# Patient Record
Sex: FEMALE | Race: WHITE
[De-identification: names, ages, dates, MRNs, and addresses within clinical notes are randomized per-mention and may not be internally consistent; named-entity substitution may affect disease eponyms.]

---

## 2018-02-20 ENCOUNTER — HOSPITAL ENCOUNTER (EMERGENCY)
Dept: HOSPITAL 62 - ER | Age: 33
Discharge: HOME | End: 2018-02-20
Payer: COMMERCIAL

## 2018-02-20 VITALS — DIASTOLIC BLOOD PRESSURE: 98 MMHG | SYSTOLIC BLOOD PRESSURE: 141 MMHG

## 2018-02-20 DIAGNOSIS — R06.00: ICD-10-CM

## 2018-02-20 DIAGNOSIS — R52: ICD-10-CM

## 2018-02-20 DIAGNOSIS — R50.9: Primary | ICD-10-CM

## 2018-02-20 DIAGNOSIS — R05: ICD-10-CM

## 2018-02-20 PROCEDURE — 99283 EMERGENCY DEPT VISIT LOW MDM: CPT

## 2018-02-20 NOTE — ER DOCUMENT REPORT
HPI





- HPI


Patient complains to provider of: Flulike symptoms


Pain Level: 2


Context: 





Flulike symptoms that started yesterday.  She admits to fever, body aches, 

cough.  States that she works at [employer] she admits to likely sick contacts.  

Did not receive a flu vaccine this year.  Otherwise denies any nausea, vomiting

, abdominal pain, diarrhea, constipation.  Admits to adequate p.o. intake and 

denies any decreased urine output.  Otherwise healthy female.  Non-smoker





- CONSTITUTIONAL


Constitutional: REPORTS: Fever, Chills





- EENT


EENT: DENIES: Sore Throat, Ear Pain, Eye problems





- NEURO


Neurology: REPORTS: Headache, Weakness.  DENIES: Vision blurred, Dizzinesss / 

Vertigo





- CARDIOVASCULAR


Cardiovascular: DENIES: Chest pain





- RESPIRATORY


Respiratory: REPORTS: Trouble Breathing, Coughing





- GASTROINTESTINAL


Gastrointestinal: DENIES: Abdominal Pain, Black / Bloody Stools





- URINARY


Urinary: DENIES: Dysuria, Urgency, Frequency





- REPRODUCTIVE


Reproductive: DENIES: Pregnant:





- MUSCULOSKELETAL


Musculoskeletal: REPORTS: Extremity pain - Body aches





Past Medical History





- Social History


Smoking Status: Unknown if Ever Smoked


Chew tobacco use (# tins/day): No


Frequency of alcohol use: None


Drug Abuse: None


Family History: Reviewed & Not Pertinent, Other - mom and grandmother both with 

gallbladder removed


Patient has suicidal ideation: No


Patient has homicidal ideation: No


Pulmonary Medical History: Reports: Hx Bronchitis


Renal/ Medical History: Denies: Hx Peritoneal Dialysis


Past Surgical History: Reports: Hx Cholecystectomy





- Immunizations


Immunizations up to date: Yes


Hx Diphtheria, Pertussis, Tetanus Vaccination: Yes





Vertical Provider Document





- CONSTITUTIONAL


Agree With Documented VS: Yes


Notes: 





PHYSICAL EXAM


GENERAL: Alert, interacts well. 


HEENT: NCAT, pale conjunctiva, extraocular movements intact, pupils PERRL. 

external ear normal, no evidence of external auditory canal tenderness, blood/

drainage, cerumen impaction, TM intact without evidence of effusion, bulging, 

injection, MMM, Uvula midline.  Airway patent. No evidence of tonsillar 

enlargement, peritonsillar abscess, retropharyngeal abscess.


LUNGS: Clear to auscultation bilaterally, no wheezes, rales, or rhonchi. No 

respiratory distress.


HEART: Regular rate and rhythm. No murmurs, gallops, or rubs.


EXTREMITIES: Moves all 4 extremities spontaneously. No edema, radial and 

dorsalis pedis pulses 2/4 bilaterally. No cyanosis. 


NEUROLOGICAL: Alert and oriented x4. Normal speech.


PSYCH: Normal affect, normal mood.


SKIN: Warm, dry, normal turgor. No rashes or lesions noted.








- INFECTION CONTROL


TRAVEL OUTSIDE OF THE [place] IN LAST 30 DAYS: No





- RESPIRATORY


O2 Sat by Pulse Oximetry: 97





Course





- Re-evaluation


Re-evalutation: 





02/20/18 21:40


Patient presents with cough, body aches and fever at home consistent with a 

diagnosis of influenza. Patient is overall well in appearance, in no acute 

distress.  Lung sounds clear.  Able to tolerate oral intake without difficulty 

here in the emergency department.  After risks and benefits conversation with 

the patient regarding the use of Tamiflu, they have elected to use supportive 

care without Tamiflu based on concerns about lack of efficacy as well as the 

side effect profile. At this time will discharge with return precautions and 

follow-up recommendations.  Verbal discharge instructions given a the bedside 

and opportunity for questions given. Medication warnings reviewed. Patient is 

in agreement with this plan and has verbalized understanding of return 

precautions and the need for primary care follow-up in the next 24-72 hours.





- Vital Signs


Vital signs: 


 











Temp Pulse Resp BP Pulse Ox


 


 99.5 F   94   20   141/98 H  97 


 


 02/20/18 21:12  02/20/18 21:12  02/20/18 21:12  02/20/18 21:12  02/20/18 21:12














Discharge





- Discharge


Clinical Impression: 


 Flu-like symptoms





Condition: Good


Disposition: HOME, SELF-CARE


Additional Instructions: 


You have symptoms consistent with influenza.  There is no treatment that is 

effective for this diagnosis other than supportive care at home. This includes 

drinking plenty of fluids, using Tylenol or ibuprofen as needed for fever and 

discomfort, and Zofran as needed for nausea and vomiting.  Please follow 

closely with you primary care physician the next 1-2 days regarding this 

diagnosis.  Return to the emergency department immediately if you began to have 

persistent vomiting prevents you from being able to keep fluids down for more 

than 12 hours, you pass out, you began having difficulty breathing, you become 

confused, or you have any other symptoms that are worrisome to you.


Prescriptions: 


Benzonatate [Tessalon Perles 100 mg Capsule] 100 mg PO ASDIR PRN #40 capsule


 PRN Reason: 


Ondansetron [Zofran Odt 4 mg Tablet] 1 - 2 tab PO Q4H PRN #15 tab.rapdis


 PRN Reason: For Nausea/Vomiting


Forms:  Elevated Blood Pressure


Referrals: 


REAGAN CHOI MD [[facility]] - Follow up in 1 week